# Patient Record
(demographics unavailable — no encounter records)

---

## 2025-06-03 NOTE — DISCUSSION/SUMMARY
[FreeTextEntry1] : Ms. Mccloud is a 33 yo woman with hx of myotonic dystrophy 1, with family history on her father and paternal grandmother.  She has mild distal muscle weakness with neck muscle weakness. Up-to-date with PCP for endocrinopathy, card for EKG and echo yearly, optho for cataract/glaucoma check up. She reports she sleeps 10 hours a day but feel still no fully rested. possible respiratory muscle weakness from the disease or PATRICK, will refer to pulmonary for eval and PFT, she may require NIV. refer to [facility] for OT/PT She will email me her medical record including genetic result, I will email to clinical trial sites for her for possible drug tiral. RTC 9 months. I spent the time noted on the day of this patient encounter preparing for, providing and documenting the above E/M service and counseling and educate patient on differential, workup, disease course, and treatment/management. Education was provided to the patient during this encounter. All questions and concerns were answered and addressed in detail. The patient verbalized understanding and agreed to plan. Patient was advised to continue to monitor for neurologic symptoms and to notify my office or go to the nearest emergency room if there are any changes. Any orders/referrals and communications were provided as well. side effects of the above medications were discussed in detail including but not limited to applicable black box warning and teratogenicity as appropriate.  Patient was advised to bring previous records to my office.

## 2025-06-03 NOTE — PHYSICAL EXAM
[FreeTextEntry1] : bilateral incomplete ptosis, no fatigability. bifacial weakness. loss of temporal fat pad. no frontal balding.  Neck flexion 3+/4 sitting. supine 1/5, need to use her hand to lift her head up. proximal 5/5 both UEs and LEs distal UEs: wrist 5/5. finger extension 4-/5. flexion 4+/5. abduction 3+/5.   myotonia. Percussion myotonia. distal LEs: ankle dorsiflexion 5/5. plantarflexion 5/5 toe dorsiflex4++/5. toe plantarfelx4+/5.  DTRs 0 sensory intact. abdominal muscle normal. no winging scapular

## 2025-06-03 NOTE — HISTORY OF PRESENT ILLNESS
[FreeTextEntry1] : She has DM 1 at age of 14, her father and paternal grandmother also had it. She had genetic test which confirmed but I do not have any record to review. She saw seen by her father neurologist.  Myotonia on her hands. Now she was unable to walk on tiptoe no more. Her father was seen by Dr. Rod, he passed away.  kirk, Steven Goldberg.  She has a younger sister, 29. She is fine. She had myotonia on her hands, tongue seized up in her teens. Was not able to release her  occasionally.  Able to do sport since young, wasn't athletic but able to do everything.  She has no eye problem, no cataract, last in April.  She saw her PCP and had blood work check for endocrinopathy. Checked with OB/GYN last year, everything was fine. Card checked her on EKG and echo annually. She always has been thin. Now, she felt her feet is more off balance. No difficulty swallowing, no choking.  no double vision. wearing contact lens.  No overt facial weakness.  She sleep 10 hours, bu always, difficutl to get up in AM. No AM headache. She uses 1 pillow at night.